# Patient Record
Sex: MALE | Race: WHITE | URBAN - METROPOLITAN AREA
[De-identification: names, ages, dates, MRNs, and addresses within clinical notes are randomized per-mention and may not be internally consistent; named-entity substitution may affect disease eponyms.]

---
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
## 2025-05-06 ENCOUNTER — OFFICE (OUTPATIENT)
Dept: URBAN - METROPOLITAN AREA CLINIC 115 | Facility: CLINIC | Age: 20
Setting detail: OPHTHALMOLOGY
End: 2025-05-06
Payer: COMMERCIAL

## 2025-05-06 DIAGNOSIS — H25.041: ICD-10-CM

## 2025-05-06 PROCEDURE — 92014 COMPRE OPH EXAM EST PT 1/>: CPT | Performed by: OPHTHALMOLOGY

## 2025-05-06 ASSESSMENT — REFRACTION_AUTOREFRACTION
OD_SPHERE: UTP
OS_SPHERE: -0.50

## 2025-05-06 ASSESSMENT — CONFRONTATIONAL VISUAL FIELD TEST (CVF)
OD_FINDINGS: FULL
OS_FINDINGS: FULL

## 2025-05-06 ASSESSMENT — TONOMETRY
OS_IOP_MMHG: 11
OD_IOP_MMHG: 13

## 2025-05-06 ASSESSMENT — VISUAL ACUITY
OS_BCVA: 20/40
OD_BCVA: 20/20

## 2025-06-30 ENCOUNTER — OFFICE (OUTPATIENT)
Dept: URBAN - METROPOLITAN AREA CLINIC 115 | Facility: CLINIC | Age: 20
Setting detail: OPHTHALMOLOGY
End: 2025-06-30
Payer: COMMERCIAL

## 2025-06-30 DIAGNOSIS — H25.041: ICD-10-CM

## 2025-06-30 PROCEDURE — 92014 COMPRE OPH EXAM EST PT 1/>: CPT | Performed by: OPHTHALMOLOGY

## 2025-06-30 ASSESSMENT — REFRACTION_MANIFEST
OD_SPHERE: -0.50
OD_VA1: 20/40+1
OS_SPHERE: -0.50
OS_VA1: 20/20
OS_CYLINDER: SPH
OD_CYLINDER: SPH

## 2025-06-30 ASSESSMENT — TONOMETRY
OD_IOP_MMHG: 14
OS_IOP_MMHG: 12

## 2025-06-30 ASSESSMENT — CONFRONTATIONAL VISUAL FIELD TEST (CVF)
OD_FINDINGS: FULL
OS_FINDINGS: FULL

## 2025-06-30 ASSESSMENT — VISUAL ACUITY
OD_BCVA: 20/20
OS_BCVA: 20/40